# Patient Record
Sex: MALE | Race: AMERICAN INDIAN OR ALASKA NATIVE | Employment: FULL TIME | URBAN - METROPOLITAN AREA
[De-identification: names, ages, dates, MRNs, and addresses within clinical notes are randomized per-mention and may not be internally consistent; named-entity substitution may affect disease eponyms.]

---

## 2018-02-16 ENCOUNTER — HOSPITAL ENCOUNTER (OUTPATIENT)
Dept: CT IMAGING | Age: 38
Discharge: HOME | End: 2018-02-16
Attending: ORTHOPAEDIC SURGERY
Payer: SELF-PAY

## 2018-02-16 ENCOUNTER — OFFICE VISIT (OUTPATIENT)
Dept: ORTHOPEDIC SURGERY | Age: 38
End: 2018-02-16

## 2018-02-16 ENCOUNTER — TELEPHONE (OUTPATIENT)
Dept: ORTHOPEDIC SURGERY | Age: 38
End: 2018-02-16

## 2018-02-16 VITALS
TEMPERATURE: 97.6 F | WEIGHT: 189 LBS | OXYGEN SATURATION: 98 % | HEART RATE: 72 BPM | BODY MASS INDEX: 25.05 KG/M2 | HEIGHT: 73 IN | DIASTOLIC BLOOD PRESSURE: 71 MMHG | SYSTOLIC BLOOD PRESSURE: 143 MMHG

## 2018-02-16 DIAGNOSIS — M54.2 NECK PAIN: ICD-10-CM

## 2018-02-16 DIAGNOSIS — M25.512 ACUTE PAIN OF LEFT SHOULDER: Primary | ICD-10-CM

## 2018-02-16 DIAGNOSIS — M54.5 ACUTE LEFT-SIDED LOW BACK PAIN, WITH SCIATICA PRESENCE UNSPECIFIED: ICD-10-CM

## 2018-02-16 PROCEDURE — 72125 CT NECK SPINE W/O DYE: CPT

## 2018-02-16 RX ORDER — HYDROCODONE BITARTRATE AND ACETAMINOPHEN 10; 325 MG/1; MG/1
1 TABLET ORAL
Qty: 21 TAB | Refills: 0 | Status: SHIPPED | OUTPATIENT
Start: 2018-02-16 | End: 2019-05-31
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
## 2018-02-16 NOTE — LETTER
NOTIFICATION RETURN TO WORK / SCHOOL 
 
2/16/2018 11:50 AM 
 
Mr. Jennifer Dan [address] [facility] [address] To Whom It May Concern: 
 
Jennifer Dan is currently under the care of [address] [place] [address] Troy Quevedo. He will remain out of work for the next 2 weeks. Will return to the office in 2 weeks for reevaluation. If there are questions or concerns please have the patient contact our office. Sincerely, Enid Senior MD

## 2018-02-19 ENCOUNTER — TELEPHONE (OUTPATIENT)
Dept: ORTHOPEDIC SURGERY | Age: 38
End: 2018-02-19

## 2018-02-19 NOTE — TELEPHONE ENCOUNTER
Ok to provide note to return to work as requested.   Should be on limited duty until f/u after CT scan and MRI

## 2018-02-19 NOTE — LETTER
NOTIFICATION RETURN TO WORK / SCHOOL 
 
2/19/2018 3:35 PM 
 
Mr. Khris Gaming [address] [place] [address] To Whom It May Concern: 
 
Khris Gaming is currently under the care of [address] [place], [place] [address]bridgette Quevedo. He will return to work on 02-19-18 with no restrictions. If there are questions or concerns please have the patient contact our office. Sincerely, Juni Roger MD

## 2018-02-19 NOTE — TELEPHONE ENCOUNTER
Patient called to ask that we expedite his return to work letter so he can get back to work at [employer] as soon as possible. Patient is also asking if we can contact his boss, Brenda Sue - [phone], to let him know we are working on the rtw status. Patient was advised of HIPAA reg's and says that he is giving us verbal permission to contact employer. Please advise patient when letter is ready for  at [phone].

## 2019-02-21 ENCOUNTER — APPOINTMENT (OUTPATIENT)
Dept: GENERAL RADIOLOGY | Age: 39
End: 2019-02-21
Attending: PHYSICIAN ASSISTANT
Payer: SELF-PAY

## 2019-02-21 ENCOUNTER — HOSPITAL ENCOUNTER (EMERGENCY)
Age: 39
Discharge: HOME | End: 2019-02-21
Attending: EMERGENCY MEDICINE
Payer: SELF-PAY

## 2019-02-21 VITALS
SYSTOLIC BLOOD PRESSURE: 123 MMHG | WEIGHT: 189 LBS | DIASTOLIC BLOOD PRESSURE: 80 MMHG | RESPIRATION RATE: 16 BRPM | HEIGHT: 73 IN | TEMPERATURE: 97.8 F | BODY MASS INDEX: 25.05 KG/M2 | HEART RATE: 96 BPM | OXYGEN SATURATION: 96 %

## 2019-02-21 DIAGNOSIS — S82.454P: Primary | ICD-10-CM

## 2019-02-21 PROCEDURE — 99282 EMERGENCY DEPT VISIT SF MDM: CPT

## 2019-02-21 PROCEDURE — L1830 KO IMMOB CANVAS LONG PRE OTS: HCPCS

## 2019-02-21 PROCEDURE — 73590 X-RAY EXAM OF LOWER LEG: CPT

## 2019-02-21 PROCEDURE — 73630 X-RAY EXAM OF FOOT: CPT

## 2019-02-21 NOTE — LETTER
NOTIFICATION OF RETURN TO WORK / SCHOOL 
2/21/2019 Mr. Shannen Cohen [address] [place] [address] Road [id number] To Whom It May Concern: 
 
Shannen Cohen was seen in the ED on 2/21/19 and may return to work with the use of a knee immobilizer, until further cleared by his orthopedist.  
 
 
Sincerely, Romelia Ponce PA-C
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
## 2019-02-22 NOTE — DISCHARGE INSTRUCTIONS
ZikBit Activation    Thank you for requesting access to ZikBit. Please follow the instructions below to securely access and download your online medical record. ZikBit allows you to send messages to your doctor, view your test results, renew your prescriptions, schedule appointments, and more. How Do I Sign Up? 1. In your internet browser, go to [email or web address]  2. Click on the First Time User? Click Here link in the Sign In box. You will be redirect to the New Member Sign Up page. 3. Enter your ZikBit Access Code exactly as it appears below. You will not need to use this code after youve completed the sign-up process. If you do not sign up before the expiration date, you must request a new code. ZikBit Access Code: [email or web address]  Expires: 2019  4:56 PM (This is the date your ZikBit access code will )    4. Enter the last four digits of your Social Security Number (xxxx) and Date of Birth (mm/dd/yyyy) as indicated and click Submit. You will be taken to the next sign-up page. 5. Create a ZikBit ID. This will be your ZikBit login ID and cannot be changed, so think of one that is secure and easy to remember. 6. Create a ZikBit password. You can change your password at any time. 7. Enter your Password Reset Question and Answer. This can be used at a later time if you forget your password. 8. Enter your e-mail address. You will receive e-mail notification when new information is available in [address]. 9. Click Sign Up. You can now view and download portions of your medical record. 10. Click the Download Summary menu link to download a portable copy of your medical information. Additional Information    If you have questions, please visit the Frequently Asked Questions section of the ZikBit website at [email or web address] Remember, ZikBit is NOT to be used for urgent needs. For medical emergencies, dial 911. Follow-up with ortho in 1 week. Return to the ED immediately for any new or worsening symptoms.

## 2019-02-22 NOTE — ED NOTES
Patient will bring needed documents for [facility] completion. Confirmation of follow up appointment with Matteo Tanner ([place] location) will be confirmed on 2/21/19 with patient.  will also assist with insurance enrollment

## 2019-05-31 ENCOUNTER — HOSPITAL ENCOUNTER (EMERGENCY)
Age: 39
Discharge: HOME | End: 2019-05-31
Attending: EMERGENCY MEDICINE
Payer: SELF-PAY

## 2019-05-31 ENCOUNTER — APPOINTMENT (OUTPATIENT)
Dept: GENERAL RADIOLOGY | Age: 39
End: 2019-05-31
Attending: HOSPITAL
Payer: SELF-PAY

## 2019-05-31 VITALS
SYSTOLIC BLOOD PRESSURE: 113 MMHG | TEMPERATURE: 96.7 F | RESPIRATION RATE: 16 BRPM | WEIGHT: 190 LBS | BODY MASS INDEX: 25.18 KG/M2 | OXYGEN SATURATION: 98 % | HEIGHT: 73 IN | HEART RATE: 82 BPM | DIASTOLIC BLOOD PRESSURE: 62 MMHG

## 2019-05-31 DIAGNOSIS — Z87.81: Primary | ICD-10-CM

## 2019-05-31 DIAGNOSIS — S80.11XA CONTUSION OF LEG, RIGHT, INITIAL ENCOUNTER: ICD-10-CM

## 2019-05-31 PROCEDURE — 74011250637 HC RX REV CODE- 250/637: Performed by: HOSPITAL

## 2019-05-31 PROCEDURE — L1830 KO IMMOB CANVAS LONG PRE OTS: HCPCS

## 2019-05-31 PROCEDURE — 99283 EMERGENCY DEPT VISIT LOW MDM: CPT

## 2019-05-31 PROCEDURE — 73590 X-RAY EXAM OF LOWER LEG: CPT

## 2019-05-31 RX ORDER — TRAMADOL HYDROCHLORIDE 50 MG/1
50 TABLET ORAL
Qty: 12 TAB | Refills: 0 | Status: SHIPPED | OUTPATIENT
Start: 2019-05-31 | End: 2019-06-03

## 2019-05-31 RX ORDER — IBUPROFEN 600 MG/1
600 TABLET ORAL
Status: COMPLETED | OUTPATIENT
Start: 2019-05-31 | End: 2019-05-31

## 2019-05-31 RX ORDER — ACETAMINOPHEN 500 MG
1000 TABLET ORAL
Status: COMPLETED | OUTPATIENT
Start: 2019-05-31 | End: 2019-05-31

## 2019-05-31 RX ADMIN — IBUPROFEN 600 MG: 600 TABLET ORAL at 20:39

## 2019-05-31 RX ADMIN — ACETAMINOPHEN 1000 MG: 500 TABLET ORAL at 20:38

## 2019-05-31 NOTE — LETTER
NOTIFICATION OF RETURN TO WORK / SCHOOL 
5/31/2019 Mr. Yaquelin Cleveland [address] [address] To Whom It May Concern: 
 
Yaquelin Cleveland was under the care of [facility] Emergency [facility] on the 31 of May to receive medical care. He will return to work on 6/2 If there are questions or concerns please have the patient contact our office. Sincerely, Lis Nunes, DO 
[facility] Emergency [facility]
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
## 2019-06-01 NOTE — DISCHARGE INSTRUCTIONS
Patient Education        Contusion: Care Instructions  Your Care Instructions    Contusion is the medical term for a bruise. It is the result of a direct blow or an impact, such as a fall. Contusions are common sports injuries. Most people think of a bruise as a black-and-blue spot. This happens when small blood vessels get torn and leak blood under the skin. But bones, muscles, and organs can also get bruised. This may damage deep tissues but not cause a bruise you can see. The doctor will do a physical exam to find the location of your contusion. You may also have tests to make sure you do not have a more serious injury, such as a broken bone or nerve damage. These may include X-rays or other imaging tests like a CT scan or MRI. Deep-tissue contusions may cause pain and swelling. But if there is no serious damage, they will often get better in a few weeks with home treatment. The doctor has checked you carefully, but problems can develop later. If you notice any problems or new symptoms, get medical treatment right away. Follow-up care is a key part of your treatment and safety. Be sure to make and go to all appointments, and call your doctor if you are having problems. It's also a good idea to know your test results and keep a list of the medicines you take. How can you care for yourself at home? · Put ice or a cold pack on the sore area for 10 to 20 minutes at a time to stop swelling. Put a thin cloth between the ice pack and your skin. · Be safe with medicines. Read and follow all instructions on the label. ? If the doctor gave you a prescription medicine for pain, take it as prescribed. ? If you are not taking a prescription pain medicine, ask your doctor if you can take an over-the-counter medicine. · If you can, prop up the sore area on pillows as much as possible for the next few days. Try to keep the sore area above the level of your heart. When should you call for help?   Call your doctor now or seek immediate medical care if:    · Your pain gets worse.     · You have new or worse swelling.     · You have tingling, weakness, or numbness in the area near the contusion.     · The area near the contusion is cold or pale.    Watch closely for changes in your health, and be sure to contact your doctor if:    · You do not get better as expected. Where can you learn more? Go to http://benita-sophia.info/. Enter C940 in the search box to learn more about \"Contusion: Care Instructions. \"  Current as of: September 23, 2018  Content Version: 11.9  © 1132-5614 [email or web address], [employer]. Care instructions adapted under license by [employer] (which disclaims liability or warranty for this information). If you have questions about a medical condition or this instruction, always ask your healthcare professional. [address] any warranty or liability for your use of this information.

## 2019-06-01 NOTE — ED NOTES
Patient complains of left leg pain d/t fall today. Knee immobilizer applied to left knee and crutches given to patient. Patient expressed he is in pain. Physician notified.

## 2019-06-01 NOTE — ED TRIAGE NOTES
Pt reports falling off a ladder and c/o right leg pain. Reports having right leg pain/possible fracture prior to incident today.

## 2019-07-25 ENCOUNTER — DOCUMENTATION ONLY (OUTPATIENT)
Dept: ORTHOPEDIC SURGERY | Age: 39
End: 2019-07-25

## 2021-01-05 ENCOUNTER — HOSPITAL ENCOUNTER (EMERGENCY)
Age: 41
Discharge: HOME | End: 2021-01-05
Attending: EMERGENCY MEDICINE
Payer: MEDICAID

## 2021-01-05 ENCOUNTER — APPOINTMENT (OUTPATIENT)
Dept: GENERAL RADIOLOGY | Age: 41
End: 2021-01-05
Attending: EMERGENCY MEDICINE
Payer: MEDICAID

## 2021-01-05 VITALS
TEMPERATURE: 98.1 F | OXYGEN SATURATION: 97 % | RESPIRATION RATE: 18 BRPM | HEART RATE: 62 BPM | SYSTOLIC BLOOD PRESSURE: 126 MMHG | DIASTOLIC BLOOD PRESSURE: 75 MMHG

## 2021-01-05 DIAGNOSIS — S80.11XA CONTUSION OF RIGHT LEG, INITIAL ENCOUNTER: Primary | ICD-10-CM

## 2021-01-05 PROCEDURE — 74011250637 HC RX REV CODE- 250/637: Performed by: EMERGENCY MEDICINE

## 2021-01-05 PROCEDURE — 73590 X-RAY EXAM OF LOWER LEG: CPT

## 2021-01-05 PROCEDURE — 99282 EMERGENCY DEPT VISIT SF MDM: CPT

## 2021-01-05 RX ORDER — OXYCODONE AND ACETAMINOPHEN 5; 325 MG/1; MG/1
1 TABLET ORAL
Status: COMPLETED | OUTPATIENT
Start: 2021-01-05 | End: 2021-01-05

## 2021-01-05 RX ORDER — OXYCODONE AND ACETAMINOPHEN 5; 325 MG/1; MG/1
1 TABLET ORAL
Qty: 8 TAB | Refills: 0 | Status: SHIPPED | OUTPATIENT
Start: 2021-01-05 | End: 2021-01-08

## 2021-01-05 RX ORDER — IBUPROFEN 800 MG/1
800 TABLET ORAL EVERY 8 HOURS
Qty: 15 TAB | Refills: 0 | Status: SHIPPED | OUTPATIENT
Start: 2021-01-05 | End: 2021-01-10

## 2021-01-05 RX ADMIN — OXYCODONE HYDROCHLORIDE AND ACETAMINOPHEN 1 TABLET: 5; 325 TABLET ORAL at 08:48
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
## 2021-01-05 NOTE — Clinical Note
[facility] EMERGENCY DEPT 
[address] [address] [phone] Work/School Note Date: 1/5/2021 To Whom It May concern: 
 
Marylene Sons was seen and treated today in the emergency room by the following provider(s): 
Attending Provider: Evelyn Bryson MD 
Physician Assistant: TWYLA Jean. Marylene Sons is excused from work/school on 01/05/21 and 01/06/21. He is medically clear to return to work/school on 1/7/2021.   
 
 
Sincerely, 
 
 
 
 
TWYLA Colon

## 2021-01-05 NOTE — LETTER
NOTIFICATION RETURN TO WORK / SCHOOL 
 
1/5/2021 2:59 PM 
 
Mr. Yoni Coy 
[address] [address] 
 
 
To Whom It May Concern: 
 
Yoni Coy is currently under the care of [facility] EMERGENCY DEPT. 
 
He will return to work/school on: 1/6/2020 
 
 
 
If there are questions or concerns please have the patient contact our office. 
 
 
 
Sincerely, 
 
 
Shakila Parker RN

## 2021-01-05 NOTE — ED TRIAGE NOTES
Alina Gilmore last evening while trying to ride a [employer] [employer] and fell and now has pain to rt leg just below the knee